# Patient Record
(demographics unavailable — no encounter records)

---

## 2024-12-30 NOTE — HISTORY OF PRESENT ILLNESS
[TextBox_4] : 54 year old female sent to us by Dr. Janneth Lazo for pulmonary nodules H/O cough 1 month ago. No diagnosis made at that time. She may have had COVID 2 yrs ago and 7 months ago. She did not seek medical attention and running nose abated.  Cough was noted 1 month ago. Present in the evening. Sputum was profuse. No blood. No salty taste of the sputum reported.  She has lost 9 LBS of weight-not intentional. No fevers. No chest pain.  On no meds She is a [employer] worker. One of the offices she worked at had a lot of dust in it. Other co-workers may have gotten sick. Patient said the co-workers developed cough. One of the offices may have had asbestos (signs were posted in her office in [facility]. The office was eventually closed. No surgery No known drug allergies Father  after heart surgery Lifelong non smoker. No h/o alcohol or drug ingestion.  No H/O sarcoidosis. No H/O TB in pt or family No usage of nasal oil drops  25: Here to review PFT, 6MWT, CT she has not had bronchoscopy done

## 2024-12-30 NOTE — PROCEDURE
[FreeTextEntry1] :  PFT 24 FVC: 2.40 L (80%) FEV1: 1.95 L (82%) FEV1/FVC: 81% FEF 25-75%: 2.08 L/s (83%) TLC: 3.43 L (77%) RV/T% DLCO: 19.19 (89%)  6MWT 24: 438 meters, SpO2 98% -> 96%  Chest CT 24 IMPRESSION: No significant interval change when compared to examination dated 2024. Continued evidence of patchy groundglass opacities which have a peripheral and bronchovascular distribution involving predominantly the upper and to a lesser extent mid lung zones unchanged in extent. In view of chronicity abbreviated differential includes atypical inflammatory noninfectious etiologies such as alveolar proteinosis, eosinophilic and organizing pneumonia. In the appropriate clinical setting a multifocal adenocarcinoma or pulmonary lymphoma cannot be excluded. Endoscopic correlation and continued short interval surveillance to confirm resolution is advised.  PFT 24 FVC: 2.64 L (88%) FEV1: 2.08 L (86%) FEV1/FVC: 79% FEF 25-75%: 2.12 L/s (83%) TLC: 3.71 L (84%) RV/T% DLCO: 20.58 (95%) FENO: 17 ppb 6MWT: 481 meters, SpO2 98%-98%  CT chest, completed on 23, Report as follows: -Persistent multiple scattered bilateral pulmonary patchy ground-glass opacities, most pronounced in upper and to a lesser extent mid lungs, which are nonspecific, probably infectious and/or inflammatory in etiology.

## 2024-12-30 NOTE — DISCUSSION/SUMMARY
[FreeTextEntry1] : ATTENDING'S SUMMARY: no pallor, no icterus no JVD, no hepatojugular reflux, no HSM no cervical adenopathy, no supraclavicular adenopathy normal s1/s2, no murmurs, rubs or gallops good air entry bilaterally, no wheezing, rhonchi or crackles no cyanosis, no clubbing, no articular manifestations, no thickening of the skin no articular manifestations of rheumatological disease no pedal edema

## 2024-12-30 NOTE — ASSESSMENT
[FreeTextEntry1] : It was a pleasure to see Neelam today in consultation. Her respiratory issues are summarized:  7-11-24 1. Chronic alveolar opacities CT demonstrates mainly ground glass opacities with her interlobular septal thickening and intralobular lines, sharp geographic patterns, crazy paving. Present in both in a broncho vascular pattern as well as subpleural pattern. There is a greater profusion of these in the upper and middle lobes as compared to the lower lobes. No lymph nodes, no lesions seen in the liver or the kidneys Since January, there has been a concentric drop in FVC, FEV1 and TLC as well as the diffusion capacity. Her walk distance has decreased from 481 meters to 438 meters. There is no significant desaturation  I am concerned that the patient has chronic air space disease / alveolar opacities. We have had a multiple detailed discussion with the patient which includes:  a. alveolar sarcoid b. lymphoma c. organizing pneumonia d. desquamative interstitial pneumonia e. Lipoid pneumonia is unlikely based upon detailed history we are giving f. COVID. She did test positive for COVID; however, even with a history of having had covid in the past, it is unlikely the opacities would have been unchanged in the last 6 months from COVID g. Collagen vascular disease work up is negative h. Aspiration pneumonia and infectious pneumonia would be much less likely since the opacities have not changed in 6 months i. Drug induced lung disease  We have again discussed a bronchoscopy with Neelam. We referred her to Dr. Tilley.  2. Snoring Neelam snores. She is overweight. Hgb >16. We ordered a home sleep study.  3. Elevated hemoglobin Hgb is > 16. We recommend she have an evaluation with her internist. However, if this is polycythemia secondary to hypoxia from sleep apnea, we will be getting a home sleep study.   Return to clinic: 3 months

## 2025-06-04 NOTE — HISTORY OF PRESENT ILLNESS
[FreeTextEntry1] : Ms. Garcia is a 55-year-old female, never a smoker, with no significant PMHx, who was referred by DR. MARITZA BROWNE for an evaluation of lung nodules.  At the initial visit (09/2023), images showed bilateral patchy opacities predominantly in the upper lobes. The lung opacities appear inflammatory in nature. Repeat chest CT for follow up was recommended. Follow-up CT in 2 months showed persistent GGOs. She c/o an unintentional 9lb weight loss and chronic cough for which she was prescribed ABX, antihistamine, and anti-tussive with no improvement. She was evaluated by Dr. Jaquez (01/2024) who recommended a bronchoscopy with Dr. Tilley to obtain a diagnosis (pt no show x 2).   She presents today for a follow-up visit with review of most recent CT.  CT Chest 12/22/2024:  Since most recent prior from 06/22/2024 and dating back to 7/11/2023, stable bilateral upper lobe predominant patchy bilateral peribronchovascular and peripheral ground glass opacities. Given the chronic stability, likely areas of scarring/fibrosis. No lung masses. No lymphadenopathy. No evidence of honeycombing or fibrosis.

## 2025-06-04 NOTE — DATA REVIEWED
[FreeTextEntry1] : PFT 24 FVC: 2.40 L (80%) FEV1: 1.95 L (82%) FEV1/FVC: 81% FEF 25-75%: 2.08 L/s (83%) TLC: 3.43 L (77%) RV/T% DLCO: 19.19 (89%)  6MWT 24: 438 meters, SpO2 98% -> 96%    Chest CT 24 IMPRESSION: No significant interval change when compared to examination dated 2024. Continued evidence of patchy groundglass opacities which have a peripheral and bronchovascular distribution involving predominantly the upper and to a lesser extent mid lung zones unchanged in extent. In view of chronicity abbreviated differential includes atypical inflammatory noninfectious etiologies such as alveolar proteinosis, eosinophilic and organizing pneumonia. In the appropriate clinical setting a multifocal adenocarcinoma or pulmonary lymphoma cannot be excluded. Endoscopic correlation and continued short interval surveillance to confirm resolution is advised.   CT Chest 23: -Persistent multiple scattered bilateral pulmonary patchy ground-glass opacities, most pronounced in upper and to a lesser extent mid lungs, which are nonspecific, probably infectious and/or inflammatory in etiology.   CT Chest wo IVC 23: -Configuration of the multi focal patchy area of ground glass opacity in the lung apices and upper lobe is nonspecific, but suspect for acute infection. Suggest clinical/laboratory correlation. Short term follow up ct should be obtained after appropriate testing/treatment to ensure resolution of findings.  -Heterogenous attenuation in the small thyroid gland. Small nodule is likely present. Correlate with thyroid function and thyroid sonogram.  -Extensive calcified cholelithiasis. Hepatic stenosis. No acute inflammatory changes.  -Relatively dense breast tissue bilaterally. Mammographic correlation should be obtained.

## 2025-07-24 NOTE — PROCEDURE
[FreeTextEntry1] : Chest CT 25 IMPRESSION: No significant change of chronic patchy peribronchovascular and peripheral opacities with upper and mid lung zone predominance dating back to 2023. Differential diagnosis sarcoidosis, other infiltrative granulomatous disease including GPA, or potentially organizing pneumonia especially in the setting of connective tissue disease. Large gallstones again noted.  PFT 24 FVC: 2.40 L (80%) FEV1: 1.95 L (82%) FEV1/FVC: 81% FEF 25-75%: 2.08 L/s (83%) TLC: 3.43 L (77%) RV/T% DLCO: 19.19 (89%)  6MWT 24: 438 meters, SpO2 98% -> 96%  Chest CT 24 IMPRESSION: No significant interval change when compared to examination dated 2024. Continued evidence of patchy groundglass opacities which have a peripheral and bronchovascular distribution involving predominantly the upper and to a lesser extent mid lung zones unchanged in extent. In view of chronicity abbreviated differential includes atypical inflammatory noninfectious etiologies such as alveolar proteinosis, eosinophilic and organizing pneumonia. In the appropriate clinical setting a multifocal adenocarcinoma or pulmonary lymphoma cannot be excluded. Endoscopic correlation and continued short interval surveillance to confirm resolution is advised.  PFT 24 FVC: 2.64 L (88%) FEV1: 2.08 L (86%) FEV1/FVC: 79% FEF 25-75%: 2.12 L/s (83%) TLC: 3.71 L (84%) RV/T% DLCO: 20.58 (95%) FENO: 17 ppb 6MWT: 481 meters, SpO2 98%-98%  CT chest, completed on 23, Report as follows: -Persistent multiple scattered bilateral pulmonary patchy ground-glass opacities, most pronounced in upper and to a lesser extent mid lungs, which are nonspecific, probably infectious and/or inflammatory in etiology.

## 2025-07-24 NOTE — DISCUSSION/SUMMARY
[FreeTextEntry1] : ATTENDING'S SUMMARY: 7-31-25: no pallor, no icterus no JVD, no hepatojugular reflux, no HSM no cervical adenopathy, no supraclavicular adenopathy normal s1/s2, no murmurs, rubs or gallops good air entry bilaterally, no wheezing, rhonchi or crackles no cyanosis, no clubbing, no articular manifestations, no thickening of the skin no articular manifestations of rheumatological disease no pedal edema

## 2025-07-24 NOTE — ASSESSMENT
[FreeTextEntry1] : 7-31-25:  It was a pleasure to see Neelam today in consultation. Her respiratory issues are summarized:  1. Chronic alveolar opacities CT demonstrates mainly ground glass opacities with her interlobular septal thickening and intralobular lines, sharp geographic patterns, crazy paving. Present in both in a broncho vascular pattern as well as subpleural pattern. There is a greater profusion of these in the upper and middle lobes as compared to the lower lobes. No lymph nodes, no lesions seen in the liver or the kidneys Since January, there has been a concentric drop in FVC, FEV1 and TLC as well as the diffusion capacity. Her walk distance has decreased from 481 meters to 438 meters. There is no significant desaturation  I am concerned that the patient has chronic air space disease / alveolar opacities. We have had a multiple detailed discussion with the patient which includes:  a. alveolar sarcoid b. lymphoma c. organizing pneumonia d. desquamative interstitial pneumonia e. Lipoid pneumonia is unlikely based upon detailed history we are giving f. COVID. She did test positive for COVID; however, even with a history of having had covid in the past, it is unlikely the opacities would have been unchanged in the last 6 months from COVID g. Collagen vascular disease work up is negative h. Aspiration pneumonia and infectious pneumonia would be much less likely since the opacities have not changed in 6 months i. Drug induced lung disease  We have again discussed a bronchoscopy with Neelam. We referred her to Dr. Tilley.  2. Snoring Neelam snores. She is overweight. Hgb >16. We ordered a home sleep study.  3. Elevated hemoglobin Hgb is > 16. We recommend she have an evaluation with her internist. However, if this is polycythemia secondary to hypoxia from sleep apnea, we will be getting a home sleep study.   Return to clinic: 3 months

## 2025-07-24 NOTE — HISTORY OF PRESENT ILLNESS
[TextBox_4] : 56 year old female sent to us by Dr. Janneth Lazo for pulmonary nodules H/O cough 1 month ago. No diagnosis made at that time. She may have had COVID 2 yrs ago and 7 months ago. She did not seek medical attention and running nose abated.  Cough was noted 1 month ago. Present in the evening. Sputum was profuse. No blood. No salty taste of the sputum reported.  She has lost 9 LBS of weight-not intentional. No fevers. No chest pain.  On no meds She is a [employer] worker. One of the offices she worked at had a lot of dust in it. Other co-workers may have gotten sick. Patient said the co-workers developed cough. One of the offices may have had asbestos (signs were posted in her office in [facility]. The office was eventually closed. No surgery No known drug allergies Father  after heart surgery Lifelong non smoker. No h/o alcohol or drug ingestion.  No H/O sarcoidosis. No H/O TB in pt or family No usage of nasal oil drops  25:   24: Here to review PFT, 6MWT, CT she has not had bronchoscopy done

## 2025-07-24 NOTE — HISTORY OF PRESENT ILLNESS
[TextBox_4] : 56 year old female sent to us by Dr. Janneth Lazo for pulmonary nodules H/O cough 1 month ago. No diagnosis made at that time. She may have had COVID 2 yrs ago and 7 months ago. She did not seek medical attention and running nose abated.  Cough was noted 1 month ago. Present in the evening. Sputum was profuse. No blood. No salty taste of the sputum reported.  She has lost 9 LBS of weight-not intentional. No fevers. No chest pain.  On no meds She is a [employer] worker. One of the offices she worked at had a lot of dust in it. Other co-workers may have gotten sick. Patient said the co-workers developed cough. One of the offices may have had asbestos (signs were posted in her office in [facility]. The office was eventually closed. No surgery No known drug allergies Father  after heart surgery Lifelong non smoker. No h/o alcohol or drug ingestion.  No H/O sarcoidosis. No H/O TB in pt or family No usage of nasal oil drops  25:   24: Here to review PFT, 6MWT, CT she has not had bronchoscopy done